# Patient Record
Sex: FEMALE | Race: BLACK OR AFRICAN AMERICAN | NOT HISPANIC OR LATINO | URBAN - METROPOLITAN AREA
[De-identification: names, ages, dates, MRNs, and addresses within clinical notes are randomized per-mention and may not be internally consistent; named-entity substitution may affect disease eponyms.]

---

## 2017-08-03 ENCOUNTER — EMERGENCY (EMERGENCY)
Facility: HOSPITAL | Age: 57
LOS: 1 days | Discharge: HOME | End: 2017-08-03
Attending: EMERGENCY MEDICINE | Admitting: EMERGENCY MEDICINE
Payer: SELF-PAY

## 2017-08-03 VITALS
SYSTOLIC BLOOD PRESSURE: 140 MMHG | HEART RATE: 96 BPM | DIASTOLIC BLOOD PRESSURE: 92 MMHG | RESPIRATION RATE: 16 BRPM | OXYGEN SATURATION: 96 % | TEMPERATURE: 99 F

## 2017-08-03 PROCEDURE — 99283 EMERGENCY DEPT VISIT LOW MDM: CPT

## 2017-08-03 RX ORDER — OXYCODONE HYDROCHLORIDE 5 MG/1
5 TABLET ORAL ONCE
Qty: 0 | Refills: 0 | Status: DISCONTINUED | OUTPATIENT
Start: 2017-08-03 | End: 2017-08-03

## 2017-08-03 RX ORDER — OXYCODONE HYDROCHLORIDE 5 MG/1
1 TABLET ORAL
Qty: 8 | Refills: 0 | OUTPATIENT
Start: 2017-08-03 | End: 2017-08-05

## 2017-08-03 RX ADMIN — OXYCODONE HYDROCHLORIDE 5 MILLIGRAM(S): 5 TABLET ORAL at 19:55

## 2017-08-03 NOTE — ED PROVIDER NOTE - MEDICAL DECISION MAKING DETAILS
Patient with swollen submandibular gland swelling consistent with sialolithiasis. Will recommend clindamycin tid and analgesia prn with ENT  follow up.

## 2017-08-03 NOTE — ED PROVIDER NOTE - OBJECTIVE STATEMENT
55y/o F c/o L jaw swelling/pain x 2wks. pt states she did have a toothache prior to that but the tooth is fine now. pt states she cannot eat 2/2 pain. pain radiates down her L neck. 10/10 severity. motrin helps relieve pain. last took motrin 1 hour ago. pain worse with talking, chewing, swallowing. denies dysphagia, dyspnea/SOB, fever. 55y/o F c/o L jaw swelling/pain x 2wks. pt states she did have a toothache prior to that but the tooth is fine now. pt states she cannot eat 2/2 pain. pain radiates down her L neck. 10/10 severity. motrin helps relieve pain. last took motrin 1 hour ago. pain worse with talking, chewing, swallowing. pt has an appt with her dentist for tooth extraction. denies dysphagia, dyspnea/SOB, fever. 55y/o F c/o L jaw swelling/pain x 2wks. pt states she did have a toothache prior to that but the tooth is fine now. pt states she cannot eat 2/2 pain. pain radiates down her L neck. 10/10 severity. motrin helps relieve pain. last took motrin 1 hour ago. pain worse with talking, chewing, swallowing. pt has an appt with her dentist for tooth extraction next week. denies dysphagia, dyspnea/SOB, fever. 55y/o F c/o L jaw swelling/pain x 2wks. pt states she did have a toothache prior to that but the tooth is fine now. pt states she cannot eat 2/2 pain. pain radiates down her L neck. 10/10 severity. Motrin helps relieve pain. last took motrin 1 hour ago. pain worse with talking, chewing, swallowing. pt has an appt with her dentist for tooth extraction next week. denies dysphagia, dyspnea/SOB, fever.

## 2017-08-03 NOTE — ED PROVIDER NOTE - PROGRESS NOTE DETAILS
No immediate life threatening issues present on history or clinical exam. Patient is a safe disposition home, has capacity and insight into their condition, ambulatory in the emergency department and will follow up with their doctor(s) this week. Patient and family  understand anticipatory guidance were given strict return and follow up precautions.  The patient and family have been informed of all concerning signs and symptoms to return to Emergency Department, the necessity to follow up with PMD/Clinic/follow up provided within 2-3 days was explained, and the patient and/or family reports understanding of above with capacity and insight.

## 2017-08-03 NOTE — ED PROVIDER NOTE - PLAN OF CARE
1. Hydrate. Suck on sour/tart candies, eat sour/tart foods.  2. Apply warm compresses. Massage the affected area.   3. Take Motrin 600mg every 8 hrs with food and/or Tylenol 650mg every 8hrs as needed for pain.  4. Take Clindamycin 300mg every 8 hrs x 10 days.   5. Follow up with your PMD and ENT doctor or our ENT, Dr. Fountain [phone], in 2-3 days. Follow up with your Dentist as scheduled.  6. Return to ED if your symptoms worsen or you develop any other concerning symptoms.

## 2017-08-03 NOTE — ED ADULT NURSE NOTE - OBJECTIVE STATEMENT
55 yo female A&OX3 presents to the ED with the c/o dental pain and l sided facial swelling. pt states that she has been having pain and swelling x 3 weeks. Denies fevers or chills. States that pain radiates to the l side of chest. No diff swallowing or diff breathing/ Lungs clear, equal b/l, no sob. Pt able to move head and neck with no pain. L side of face is tender to touch. MAEX4

## 2017-08-03 NOTE — ED PROVIDER NOTE - ATTENDING CONTRIBUTION TO CARE
Patient with left lower jaw swelling without dental pain or abscess.Patient has taken ibuprofen without much relief.    No fluctuance to jaw. Moderate swelling to submandibular gland and ? parotid swelling. cooperative, alert, mild distress secondary to pain, ncat, no gum fluctuance or abscess. no Dejuan's appearance, normal swallowing, trachea midline  See MDM above.

## 2017-08-03 NOTE — ED PROVIDER NOTE - CARE PLAN
Principal Discharge DX:	Sialolithiasis  Instructions for follow-up, activity and diet:	1. Hydrate. Suck on sour/tart candies, eat sour/tart foods.  2. Apply warm compresses. Massage the affected area.   3. Take Motrin 600mg every 8 hrs with food and/or Tylenol 650mg every 8hrs as needed for pain.  4. Take Clindamycin 300mg every 8 hrs x 10 days.   5. Follow up with your PMD and ENT doctor or our ENT, Dr. Fountain [phone], in 2-3 days. Follow up with your Dentist as scheduled.  6. Return to ED if your symptoms worsen or you develop any other concerning symptoms. Principal Discharge DX:	Sialolithiasis  Instructions for follow-up, activity and diet:	1. Hydrate. Suck on sour/tart candies, eat sour/tart foods.  2. Apply warm compresses. Massage the affected area.   3. Take Motrin 600mg every 8 hrs with food and/or Tylenol 650mg every 8hrs as needed for pain.  4. Take Clindamycin 300mg every 8 hrs x 10 days.   5. Follow up with your PMD and ENT doctor or our ENT, Dr. Fountain [phone], in 2-3 days. Follow up with your Dentist as scheduled.  6. Return to ED if your symptoms worsen or you develop any other concerning symptoms.

## 2019-08-28 NOTE — ED PROVIDER NOTE - CROS ED CARDIOVAS ALL NEG
Problem: Diabetes/Glucose Control  Goal: Glucose maintained within prescribed range  Description  INTERVENTIONS:  - Monitor Blood Glucose as ordered  - Assess for signs and symptoms of hyperglycemia and hypoglycemia  - Administer ordered medications to m involved in her care  - Provide timely, complete, and accurate information to patient/family  - Incorporate patient and family knowledge, values, beliefs, and cultural backgrounds into the planning and delivery of care  - Encourage patient/family to partic caregiver  - Include patient/family/discharge partner in discharge planning  - Arrange for needed discharge resources and transportation as appropriate  - Identify discharge learning needs (meds, wound care, etc)  - Arrange for interpreters to assist at [facility] signs and symptoms of electrolyte imbalances  - Administer electrolyte replacement as ordered  - Monitor response to electrolyte replacements, including rhythm and repeat lab results as appropriate  - Fluid restriction as ordered  - Instruct patient on flu negative...

## 2024-01-03 ENCOUNTER — APPOINTMENT (OUTPATIENT)
Dept: ORTHOPEDIC SURGERY | Facility: CLINIC | Age: 64
End: 2024-01-03
Payer: MEDICAID

## 2024-01-03 DIAGNOSIS — Z78.9 OTHER SPECIFIED HEALTH STATUS: ICD-10-CM

## 2024-01-03 DIAGNOSIS — M65.311 TRIGGER THUMB, RIGHT THUMB: ICD-10-CM

## 2024-01-03 PROCEDURE — 20550 NJX 1 TENDON SHEATH/LIGAMENT: CPT | Mod: RT

## 2024-01-03 PROCEDURE — 99203 OFFICE O/P NEW LOW 30 MIN: CPT | Mod: 25

## 2024-01-03 NOTE — IMAGING
[de-identified] : RIGHT HAND skin intact. mild swelling of thumb. TTP to thumb A1 pulley. wrist ROM: good extension, flexion. good pronation, supination. good EPL, limited FPL. good finger extension, flex to full fist. good finger abduction and adduction.  SILT median, ulnar, radial distributions. palpable radial pulse, brisk cap refill all digits. + catching of thumb.   XRAYS OF RIGHT HAND @R 11/24/23: no acute displaced fracture or dislocation.

## 2024-01-03 NOTE — ASSESSMENT
[FreeTextEntry1] : The condition was explained to the patient. Discussed risks and benefits of treatment options for tenosynovitis - activity modification, NSAID, splint, steroid injection, or surgery.  Patient would like to proceed with CSI for trigger finger. - Discussed risks, benefits, and alternatives as well as contents of injection. Risks include, but are not limited allergic reaction, flare reaction, injection site pain, bruising, numbness, increased blood sugar, skin discoloration, fat atrophy, tendon rupture, and infection. Risk of immune suppression and increased susceptibility to infection with steroid use. We discussed that too many injections may lead to weakening of the tendon and tendon rupture. Patient expressed understanding and would like to proceed with injection. - The skin over the RIGHT thumb A1 pulley was cleansed with alcohol and anesthetized with ethyl chloride. The flexor sheath was injected with 3mg of celestone, 0.5cc of 1% lidocaine. Site was dressed with a band-aid. Patient tolerated the procedure well. - discussed that it may take up to 1 week for symptoms to improve after CSI.  F/u PRN.

## 2024-01-03 NOTE — HISTORY OF PRESENT ILLNESS
[Dull/Aching] : dull/aching [Localized] : localized [Constant] : constant [Nothing helps with pain getting better] : Nothing helps with pain getting better [de-identified] : 1/3/24: presents with daughter - assisting with HPI and translation ([employer]). declined . 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 c/o pain with ADLs. Denies injury. Reports PCP ordered XR @R 11/24/2023. Not currently taking any medication for this.  Hx: none. [] : no [FreeTextEntry5] : R. Thumb pain that started 2 months ago w/o known injury. Saw PCP; had xrs completed @ R 11/2023.

## 2024-05-06 ENCOUNTER — APPOINTMENT (OUTPATIENT)
Dept: ORTHOPEDIC SURGERY | Facility: CLINIC | Age: 64
End: 2024-05-06